# Patient Record
Sex: FEMALE | Race: WHITE | Employment: OTHER
[De-identification: names, ages, dates, MRNs, and addresses within clinical notes are randomized per-mention and may not be internally consistent; named-entity substitution may affect disease eponyms.]

---

## 2018-10-24 ENCOUNTER — RECORDS - HEALTHEAST (OUTPATIENT)
Dept: ADMINISTRATIVE | Facility: OTHER | Age: 73
End: 2018-10-24

## 2024-09-19 ENCOUNTER — MEDICAL CORRESPONDENCE (OUTPATIENT)
Dept: HEALTH INFORMATION MANAGEMENT | Facility: CLINIC | Age: 79
End: 2024-09-19
Payer: COMMERCIAL

## 2024-09-27 ENCOUNTER — TELEPHONE (OUTPATIENT)
Dept: CARDIOLOGY | Facility: CLINIC | Age: 79
End: 2024-09-27
Payer: COMMERCIAL

## 2024-09-27 NOTE — TELEPHONE ENCOUNTER
[facility] Call Center    Phone Message    May a detailed message be left on voicemail: yes     Reason for Call: Other: Patient will like to see Dr. Maharaj or Dr. Beck as they are looking for a second opinion coming from [facility] in regards to pacemaker. Patient does have a pacemaker. Please review and call patient back to further discuss and coordinate.     Action Taken: Other: Cardiology    Travel Screening: Not Applicable     Thank you!  [facility]

## 2024-09-27 NOTE — TELEPHONE ENCOUNTER
Marked for merge with patient chart: Kizzy Ross MRN: [id number]. Documentation has been moved to that chart.

## 2024-09-27 NOTE — TELEPHONE ENCOUNTER
"Received this note in a \"marked for merge\" chart. Reviewed chart. Patient seeking a second opinion regarding AF/AT ablation that has been recommended for her at [facility]. Has PPM (dual [employer]). Will request records and scheduled next available .             [facility] Call Center     Phone Message     May a detailed message be left on voicemail: yes      Reason for Call: Other: Patient will like to see Dr. Maharaj or Dr. Beck as they are looking for a second opinion coming from [facility] in regards to pacemaker. Patient does have a pacemaker. Please review and call patient back to further discuss and coordinate.      Action Taken: Other: Cardiology     Travel Screening: Not Applicable          Thank you!  [facility]                                                      "